# Patient Record
(demographics unavailable — no encounter records)

---

## 2025-05-23 NOTE — ASSESSMENT
[FreeTextEntry1] : Plan:  PVR today >480cc   patient taught on how to CIC in office, was successfully able to pass #14 coude without difficulty, with output of 520cc.  Supplies provided.   Discontinue Tamsulosin due intolerance. Trial of Alfuzosin 10mg po daily.  Starting on Finasteride 5mg daily. side effects reviewed. pt aware take 3-6m to achieve desired effect and affect on PSA level.   Recommended patient to CIC 3x daily, but if the cath amount exceed 500ml each time, instructed to add the number of CIC to 4x daily. Advised patient to document of 3day voiding diary  Voided amt vs cath amount.   Repeat on Kidney/bladder ultrasound in 1 month to reassess the b/l hydronephrosis.  Repeat CMP in 1 month to reassess the creatinine level.   Follow up with Dr. Sauer on Tuesday.

## 2025-05-23 NOTE — PHYSICAL EXAM
[General Appearance - Well Developed] : well developed [Normal Appearance] : normal appearance [Edema] : no peripheral edema [] : no respiratory distress [Abdomen Soft] : soft [Normal Station and Gait] : the gait and station were normal for the patient's age [Not Anxious] : not anxious

## 2025-05-23 NOTE — HISTORY OF PRESENT ILLNESS
[FreeTextEntry1] : 70 yr old male patient with hx of HLD presents to office today with chief complaint of incomplete bladder emptying started about 7 weeks ago. patient saw his PCP and was started on Tamsulosin 0.4mg po daily, noticed jittery of his fingers upon waken up in the morning while on Tamsulosin.  Renal u/s obtained showed B/L Hydronephrosis without shadowing calculus. Enlarge prostate and PVR of 402cc   Creatinine level - 1.47 (05/2025) 1.12 (07/202)  Denies of dysuria/hematuria Denies urinary incontinence  Nocturia 1-2x   PVR today: >480cc

## 2025-05-27 NOTE — ASSESSMENT
[FreeTextEntry1] : Cystoscopy done See cysto note  He needs an outlet procedure  we discussed TURP , Aqua ablation , Holep  We discussed the benefits of each procedure and the technique of each  We discussed the post op symptoms  in each case  He is currently doing CIC  He is having issues with bleeding when removing coude catheter  He is interested in Aqua ablation  Will send off culture  Schedule for Aqua ablate

## 2025-05-27 NOTE — HISTORY OF PRESENT ILLNESS
[FreeTextEntry1] : pt is 70 year old  baseline closer to 1.06. taught cic. folwmax helped slightly.  had us bilateral hydro. on cic 2-3  in system.   plan cysto.

## 2025-06-13 NOTE — HISTORY OF PRESENT ILLNESS
[FreeTextEntry1] : BPH in retention Catheter in place  Catheter clamped Specimen for catheter obtained

## 2025-06-26 NOTE — HISTORY OF PRESENT ILLNESS
[FreeTextEntry1] : pt s/p aquablation last week. has had retention over weekend. now voiding small amounts freqently. drinking alot of water. urine clear. macrobid. no dysuria. pvr 157. plan continue to follow reassured. culture sent. LRk

## 2025-07-09 NOTE — ASSESSMENT
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

## 2025-07-09 NOTE — HISTORY OF PRESENT ILLNESS
[FreeTextEntry1] : Aqua Ablation done 6/18/25 Post op retention Now voiding well  [None] : no symptoms